# Patient Record
Sex: FEMALE | Race: WHITE | NOT HISPANIC OR LATINO
[De-identification: names, ages, dates, MRNs, and addresses within clinical notes are randomized per-mention and may not be internally consistent; named-entity substitution may affect disease eponyms.]

---

## 2017-06-07 ENCOUNTER — RESULT REVIEW (OUTPATIENT)
Age: 42
End: 2017-06-07

## 2018-06-11 ENCOUNTER — RESULT REVIEW (OUTPATIENT)
Age: 43
End: 2018-06-11

## 2018-06-27 ENCOUNTER — RECORD ABSTRACTING (OUTPATIENT)
Age: 43
End: 2018-06-27

## 2018-07-11 ENCOUNTER — APPOINTMENT (OUTPATIENT)
Dept: PULMONOLOGY | Facility: CLINIC | Age: 43
End: 2018-07-11
Payer: COMMERCIAL

## 2018-07-11 VITALS
HEIGHT: 60 IN | HEART RATE: 85 BPM | DIASTOLIC BLOOD PRESSURE: 66 MMHG | OXYGEN SATURATION: 97 % | SYSTOLIC BLOOD PRESSURE: 108 MMHG | BODY MASS INDEX: 18.65 KG/M2 | TEMPERATURE: 98.7 F | WEIGHT: 95 LBS | RESPIRATION RATE: 16 BRPM

## 2018-07-11 LAB
BILIRUB UR QL STRIP: ABNORMAL
CLARITY UR: CLEAR
COLLECTION METHOD: NORMAL
GLUCOSE UR-MCNC: NEGATIVE
HCG UR QL: 0.2 EU/DL
HGB UR QL STRIP.AUTO: ABNORMAL
KETONES UR-MCNC: NORMAL
LEUKOCYTE ESTERASE UR QL STRIP: NORMAL
NITRITE UR QL STRIP: NEGATIVE
PH UR STRIP: 6
PROT UR STRIP-MCNC: NEGATIVE
SP GR UR STRIP: 1.02

## 2018-07-11 PROCEDURE — 81003 URINALYSIS AUTO W/O SCOPE: CPT | Mod: QW

## 2018-07-11 PROCEDURE — 99213 OFFICE O/P EST LOW 20 MIN: CPT | Mod: 25

## 2018-07-16 LAB
CHOLEST SERPL-MCNC: 228 MG/DL
CHOLEST/HDLC SERPL: 2.7 RATIO
HDLC SERPL-MCNC: 83 MG/DL
LDLC SERPL CALC-MCNC: 124 MG/DL
TRIGL SERPL-MCNC: 106 MG/DL

## 2018-11-19 ENCOUNTER — RX RENEWAL (OUTPATIENT)
Age: 43
End: 2018-11-19

## 2019-01-04 ENCOUNTER — APPOINTMENT (OUTPATIENT)
Dept: PULMONOLOGY | Facility: CLINIC | Age: 44
End: 2019-01-04
Payer: COMMERCIAL

## 2019-01-04 VITALS
HEIGHT: 60 IN | HEART RATE: 81 BPM | DIASTOLIC BLOOD PRESSURE: 71 MMHG | TEMPERATURE: 98.8 F | WEIGHT: 95 LBS | BODY MASS INDEX: 18.65 KG/M2 | SYSTOLIC BLOOD PRESSURE: 108 MMHG | RESPIRATION RATE: 16 BRPM | OXYGEN SATURATION: 98 %

## 2019-01-04 PROCEDURE — 99213 OFFICE O/P EST LOW 20 MIN: CPT | Mod: 25

## 2019-01-04 PROCEDURE — 94060 EVALUATION OF WHEEZING: CPT

## 2019-01-29 ENCOUNTER — MEDICATION RENEWAL (OUTPATIENT)
Age: 44
End: 2019-01-29

## 2019-01-29 ENCOUNTER — APPOINTMENT (OUTPATIENT)
Dept: PULMONOLOGY | Facility: CLINIC | Age: 44
End: 2019-01-29
Payer: COMMERCIAL

## 2019-01-29 VITALS
RESPIRATION RATE: 16 BRPM | HEART RATE: 108 BPM | TEMPERATURE: 99.1 F | OXYGEN SATURATION: 100 % | SYSTOLIC BLOOD PRESSURE: 121 MMHG | DIASTOLIC BLOOD PRESSURE: 82 MMHG

## 2019-01-29 PROCEDURE — 36415 COLL VENOUS BLD VENIPUNCTURE: CPT

## 2019-01-29 PROCEDURE — 87804 INFLUENZA ASSAY W/OPTIC: CPT | Mod: QW

## 2019-01-29 PROCEDURE — 99214 OFFICE O/P EST MOD 30 MIN: CPT | Mod: 25

## 2019-01-29 PROCEDURE — 71046 X-RAY EXAM CHEST 2 VIEWS: CPT

## 2019-01-29 RX ORDER — HYDROCODONE BITARTRATE AND HOMATROPINE METHYLBROMIDE 5; 1.5 MG/5ML; MG/5ML
5-1.5 SOLUTION ORAL
Qty: 150 | Refills: 0 | Status: DISCONTINUED | COMMUNITY
Start: 2019-01-29 | End: 2019-01-29

## 2019-01-29 NOTE — HISTORY OF PRESENT ILLNESS
[FreeTextEntry1] : Few days of feeling poorly. Low grade fever. Today increased cough. Dry cough. \par Rhinitis with yellow secretions.\par \par Kids had flu last week

## 2019-01-29 NOTE — PROCEDURE
[FreeTextEntry1] : PA and lateral chest radiograph reveals a normal heart and mediastinum. Bony structures are intact. Lung fields are clear bilaterally. There is no significant pleural disease.\par

## 2019-01-29 NOTE — PHYSICAL EXAM
[General Appearance - Well Developed] : well developed [General Appearance - Well Nourished] : well nourished [Normal Conjunctiva] : the conjunctiva exhibited no abnormalities [Eyelids - No Xanthelasma] : the eyelids demonstrated no xanthelasmas [Normal Oropharynx] : normal oropharynx [Neck Appearance] : the appearance of the neck was normal [Neck Cervical Mass (___cm)] : no neck mass was observed [Jugular Venous Distention Increased] : there was no jugular-venous distention [Thyroid Diffuse Enlargement] : the thyroid was not enlarged [Thyroid Nodule] : there were no palpable thyroid nodules [Heart Rate And Rhythm] : heart rate and rhythm were normal [Heart Sounds] : normal S1 and S2 [Murmurs] : no murmurs present [Edema] : no peripheral edema present [Auscultation Breath Sounds / Voice Sounds] : lungs were clear to auscultation bilaterally [Lungs Percussion] : the lungs were normal to percussion [Abdomen Soft] : soft [Abdomen Tenderness] : non-tender [Abdomen Mass (___ Cm)] : no abdominal mass palpated [Nail Clubbing] : no clubbing of the fingernails [Cyanosis, Localized] : no localized cyanosis [Petechial Hemorrhages (___cm)] : no petechial hemorrhages [] : no ischemic changes

## 2019-01-29 NOTE — ASSESSMENT
[FreeTextEntry1] : Influenza\par Mild asthma without wheeze. Positive cough\par \par Change arnuity to advair until improved.\par PRN Hydromet\par Tessolon during day\par \par Call or f/u if increasing asthma symptoms

## 2019-01-30 LAB
BASOPHILS # BLD AUTO: 0.01 K/UL
BASOPHILS NFR BLD AUTO: 0.1 %
EOSINOPHIL # BLD AUTO: 0.02 K/UL
EOSINOPHIL NFR BLD AUTO: 0.3 %
FLUAV SPEC QL CULT: NORMAL
FLUBV AG SPEC QL IA: NORMAL
HCT VFR BLD CALC: 42.2 %
HGB BLD-MCNC: 13.5 G/DL
IMM GRANULOCYTES NFR BLD AUTO: 0.3 %
LYMPHOCYTES # BLD AUTO: 1.3 K/UL
LYMPHOCYTES NFR BLD AUTO: 16.9 %
MAN DIFF?: NORMAL
MCHC RBC-ENTMCNC: 29.7 PG
MCHC RBC-ENTMCNC: 32 GM/DL
MCV RBC AUTO: 92.7 FL
MONOCYTES # BLD AUTO: 0.37 K/UL
MONOCYTES NFR BLD AUTO: 4.8 %
NEUTROPHILS # BLD AUTO: 5.95 K/UL
NEUTROPHILS NFR BLD AUTO: 77.6 %
PLATELET # BLD AUTO: 225 K/UL
RBC # BLD: 4.55 M/UL
RBC # FLD: 12.9 %
WBC # FLD AUTO: 7.67 K/UL

## 2019-06-04 ENCOUNTER — NON-APPOINTMENT (OUTPATIENT)
Age: 44
End: 2019-06-04

## 2019-06-04 ENCOUNTER — LABORATORY RESULT (OUTPATIENT)
Age: 44
End: 2019-06-04

## 2019-06-04 ENCOUNTER — APPOINTMENT (OUTPATIENT)
Dept: PULMONOLOGY | Facility: CLINIC | Age: 44
End: 2019-06-04
Payer: COMMERCIAL

## 2019-06-04 VITALS
BODY MASS INDEX: 18.65 KG/M2 | WEIGHT: 95 LBS | SYSTOLIC BLOOD PRESSURE: 121 MMHG | HEART RATE: 75 BPM | DIASTOLIC BLOOD PRESSURE: 78 MMHG | OXYGEN SATURATION: 100 % | HEIGHT: 60 IN

## 2019-06-04 DIAGNOSIS — J10.1 INFLUENZA DUE TO OTHER IDENTIFIED INFLUENZA VIRUS WITH OTHER RESPIRATORY MANIFESTATIONS: ICD-10-CM

## 2019-06-04 LAB
ALBUMIN: 10
BILIRUB UR QL STRIP: NEGATIVE
CLARITY UR: CLEAR
COLLECTION METHOD: NORMAL
CREATININE: 200
GLUCOSE UR-MCNC: NEGATIVE
HCG UR QL: 0.2 EU/DL
HGB UR QL STRIP.AUTO: NEGATIVE
KETONES UR-MCNC: NEGATIVE
LEUKOCYTE ESTERASE UR QL STRIP: NEGATIVE
MICROALBUMIN/CREAT UR TEST STR-RTO: <30
NITRITE UR QL STRIP: NEGATIVE
PH UR STRIP: 7.5
PROT UR STRIP-MCNC: NEGATIVE
SP GR UR STRIP: 1.01

## 2019-06-04 PROCEDURE — 93000 ELECTROCARDIOGRAM COMPLETE: CPT

## 2019-06-04 PROCEDURE — 94060 EVALUATION OF WHEEZING: CPT

## 2019-06-04 PROCEDURE — 82044 UR ALBUMIN SEMIQUANTITATIVE: CPT | Mod: QW

## 2019-06-04 PROCEDURE — 81003 URINALYSIS AUTO W/O SCOPE: CPT | Mod: QW

## 2019-06-04 PROCEDURE — 99396 PREV VISIT EST AGE 40-64: CPT | Mod: 25

## 2019-06-04 PROCEDURE — 36415 COLL VENOUS BLD VENIPUNCTURE: CPT

## 2019-06-04 RX ORDER — BENZONATATE 200 MG/1
200 CAPSULE ORAL 3 TIMES DAILY
Qty: 60 | Refills: 2 | Status: DISCONTINUED | COMMUNITY
Start: 2019-01-29 | End: 2019-06-04

## 2019-06-04 RX ORDER — FLUTICASONE FUROATE 200 UG/1
200 POWDER RESPIRATORY (INHALATION) DAILY
Refills: 0 | Status: DISCONTINUED | COMMUNITY
End: 2019-06-04

## 2019-06-04 RX ORDER — FLUTICASONE PROPIONATE AND SALMETEROL 50; 500 UG/1; UG/1
500-50 POWDER RESPIRATORY (INHALATION)
Qty: 1 | Refills: 5 | Status: DISCONTINUED | COMMUNITY
Start: 2019-01-29 | End: 2019-06-04

## 2019-06-04 RX ORDER — HYDROCODONE POLISTIREX AND CHLORPHENIRAMINE POLISTIREX 10; 8 MG/5ML; MG/5ML
10-8 SUSPENSION, EXTENDED RELEASE ORAL
Qty: 100 | Refills: 0 | Status: DISCONTINUED | COMMUNITY
Start: 2019-01-29 | End: 2019-06-04

## 2019-06-04 RX ORDER — FLUTICASONE FUROATE 200 UG/1
200 POWDER RESPIRATORY (INHALATION) DAILY
Qty: 1 | Refills: 3 | Status: DISCONTINUED | COMMUNITY
Start: 2018-07-11 | End: 2019-06-04

## 2019-06-04 RX ORDER — MONTELUKAST SODIUM 10 MG/1
10 TABLET, FILM COATED ORAL DAILY
Refills: 0 | Status: DISCONTINUED | COMMUNITY
End: 2019-06-04

## 2019-06-04 NOTE — HISTORY OF PRESENT ILLNESS
[FreeTextEntry1] : Colonoscopy \par Mammo Y\par GYN Had appt\par Optho N\par Derm Due\par BMD\par \par \par Doing OK with MD\par Resp status good. Occ cough No wheeze. Diff. with allergies

## 2019-06-04 NOTE — ASSESSMENT
[FreeTextEntry1] : Patient with multiple medical problems. All medical problems as well his medications were reviewed. Medications were renewed.\par \par There is no significant need for change in present medication or therapy.\par \par Asthma meets goals. Denies significant nocturnal symptoms. Rare beta agonist use. Denies significant cough, wheezing, SOB.\par \par Labs drawn in office today\par

## 2019-06-04 NOTE — PHYSICAL EXAM
[General Appearance - Well Developed] : well developed [General Appearance - Well Nourished] : well nourished [Normal Conjunctiva] : the conjunctiva exhibited no abnormalities [Normal Oropharynx] : normal oropharynx [Eyelids - No Xanthelasma] : the eyelids demonstrated no xanthelasmas [Neck Cervical Mass (___cm)] : no neck mass was observed [Jugular Venous Distention Increased] : there was no jugular-venous distention [Neck Appearance] : the appearance of the neck was normal [Thyroid Nodule] : there were no palpable thyroid nodules [Thyroid Diffuse Enlargement] : the thyroid was not enlarged [Heart Rate And Rhythm] : heart rate and rhythm were normal [Heart Sounds] : normal S1 and S2 [Murmurs] : no murmurs present [Edema] : no peripheral edema present [Lungs Percussion] : the lungs were normal to percussion [Auscultation Breath Sounds / Voice Sounds] : lungs were clear to auscultation bilaterally [Abdomen Tenderness] : non-tender [Abdomen Soft] : soft [Abdomen Mass (___ Cm)] : no abdominal mass palpated [Nail Clubbing] : no clubbing of the fingernails [Cyanosis, Localized] : no localized cyanosis [Petechial Hemorrhages (___cm)] : no petechial hemorrhages [Skin Color & Pigmentation] : normal skin color and pigmentation [] : no rash [No Venous Stasis] : no venous stasis [Skin Lesions] : no skin lesions [No Skin Ulcers] : no skin ulcer [Deep Tendon Reflexes (DTR)] : deep tendon reflexes were 2+ and symmetric [No Xanthoma] : no  xanthoma was observed [No Focal Deficits] : no focal deficits [Sensation] : the sensory exam was normal to light touch and pinprick [Oriented To Time, Place, And Person] : oriented to person, place, and time [Impaired Insight] : insight and judgment were intact [Affect] : the affect was normal

## 2019-06-04 NOTE — PROCEDURE
[FreeTextEntry1] : Pulmonary function testing.\par FEV1, FVC, and FEV1/FVC are within normal limits. There was not a significant response to inhaled bronchodilator. \par \par EKG NSR

## 2019-06-11 LAB
ALBUMIN SERPL ELPH-MCNC: 4.4 G/DL
ALP BLD-CCNC: 52 U/L
ALT SERPL-CCNC: 12 U/L
ANION GAP SERPL CALC-SCNC: 13 MMOL/L
AST SERPL-CCNC: 18 U/L
BILIRUB DIRECT SERPL-MCNC: 0.1 MG/DL
BILIRUB INDIRECT SERPL-MCNC: 0.3 MG/DL
BILIRUB SERPL-MCNC: 0.4 MG/DL
BUN SERPL-MCNC: 12 MG/DL
CALCIUM SERPL-MCNC: 9.4 MG/DL
CHLORIDE SERPL-SCNC: 103 MMOL/L
CHOLEST SERPL-MCNC: 241 MG/DL
CHOLEST/HDLC SERPL: 2.7 RATIO
CO2 SERPL-SCNC: 22 MMOL/L
CREAT SERPL-MCNC: 0.63 MG/DL
ESTIMATED AVERAGE GLUCOSE: 105 MG/DL
GLUCOSE SERPL-MCNC: 87 MG/DL
HBA1C MFR BLD HPLC: 5.3 %
HDLC SERPL-MCNC: 90 MG/DL
LDLC SERPL CALC-MCNC: 134 MG/DL
POTASSIUM SERPL-SCNC: 4.2 MMOL/L
PROT SERPL-MCNC: 6.8 G/DL
SODIUM SERPL-SCNC: 138 MMOL/L
T3 SERPL-MCNC: 123 NG/DL
T3RU NFR SERPL: 1.1 TBI
T4 FREE SERPL-MCNC: 1.2 NG/DL
T4 SERPL-MCNC: 7.6 UG/DL
TRIGL SERPL-MCNC: 83 MG/DL
TSH SERPL-ACNC: 3.07 UIU/ML

## 2019-06-28 ENCOUNTER — RX RENEWAL (OUTPATIENT)
Age: 44
End: 2019-06-28

## 2019-09-18 ENCOUNTER — RESULT REVIEW (OUTPATIENT)
Age: 44
End: 2019-09-18

## 2019-12-15 ENCOUNTER — RX RENEWAL (OUTPATIENT)
Age: 44
End: 2019-12-15

## 2020-01-03 ENCOUNTER — RX RENEWAL (OUTPATIENT)
Age: 45
End: 2020-01-03

## 2020-11-20 ENCOUNTER — LABORATORY RESULT (OUTPATIENT)
Age: 45
End: 2020-11-20

## 2020-11-20 ENCOUNTER — NON-APPOINTMENT (OUTPATIENT)
Age: 45
End: 2020-11-20

## 2020-11-20 ENCOUNTER — APPOINTMENT (OUTPATIENT)
Dept: PULMONOLOGY | Facility: CLINIC | Age: 45
End: 2020-11-20
Payer: COMMERCIAL

## 2020-11-20 VITALS
WEIGHT: 95 LBS | OXYGEN SATURATION: 100 % | HEIGHT: 60 IN | TEMPERATURE: 98.2 F | HEART RATE: 80 BPM | BODY MASS INDEX: 18.65 KG/M2 | RESPIRATION RATE: 15 BRPM | DIASTOLIC BLOOD PRESSURE: 85 MMHG | SYSTOLIC BLOOD PRESSURE: 137 MMHG

## 2020-11-20 LAB
ALBUMIN: 30
BILIRUB UR QL STRIP: NORMAL
CLARITY UR: CLEAR
COLLECTION METHOD: NORMAL
CREATININE: 200
GLUCOSE UR-MCNC: NORMAL
HCG UR QL: 0.2 EU/DL
HGB UR QL STRIP.AUTO: NORMAL
KETONES UR-MCNC: NORMAL
LEUKOCYTE ESTERASE UR QL STRIP: NORMAL
MICROALBUMIN/CREAT UR TEST STR-RTO: 30
NITRITE UR QL STRIP: NORMAL
PH UR STRIP: 5.5
PROT UR STRIP-MCNC: NORMAL
SP GR UR STRIP: 1.02

## 2020-11-20 PROCEDURE — 82044 UR ALBUMIN SEMIQUANTITATIVE: CPT | Mod: QW

## 2020-11-20 PROCEDURE — 81003 URINALYSIS AUTO W/O SCOPE: CPT | Mod: QW

## 2020-11-20 PROCEDURE — 93000 ELECTROCARDIOGRAM COMPLETE: CPT

## 2020-11-20 PROCEDURE — 36415 COLL VENOUS BLD VENIPUNCTURE: CPT

## 2020-11-20 PROCEDURE — 99396 PREV VISIT EST AGE 40-64: CPT | Mod: 25

## 2020-11-23 ENCOUNTER — RESULT REVIEW (OUTPATIENT)
Age: 45
End: 2020-11-23

## 2020-11-23 ENCOUNTER — RX RENEWAL (OUTPATIENT)
Age: 45
End: 2020-11-23

## 2020-11-24 LAB
25(OH)D3 SERPL-MCNC: 47.5 NG/ML
ALBUMIN SERPL ELPH-MCNC: 4.8 G/DL
ALP BLD-CCNC: 51 U/L
ALT SERPL-CCNC: 9 U/L
ANION GAP SERPL CALC-SCNC: 11 MMOL/L
APPEARANCE: CLEAR
AST SERPL-CCNC: 14 U/L
BACTERIA UR CULT: NORMAL
BACTERIA: NEGATIVE
BASOPHILS # BLD AUTO: 0.04 K/UL
BASOPHILS NFR BLD AUTO: 0.6 %
BILIRUB DIRECT SERPL-MCNC: 0.1 MG/DL
BILIRUB INDIRECT SERPL-MCNC: 0.2 MG/DL
BILIRUB SERPL-MCNC: 0.3 MG/DL
BILIRUBIN URINE: NEGATIVE
BLOOD URINE: NEGATIVE
BUN SERPL-MCNC: 17 MG/DL
CALCIUM SERPL-MCNC: 9.4 MG/DL
CHLORIDE SERPL-SCNC: 101 MMOL/L
CHOLEST SERPL-MCNC: 259 MG/DL
CO2 SERPL-SCNC: 24 MMOL/L
COLOR: NORMAL
CREAT SERPL-MCNC: 0.63 MG/DL
EOSINOPHIL # BLD AUTO: 0.06 K/UL
EOSINOPHIL NFR BLD AUTO: 0.9 %
ESTIMATED AVERAGE GLUCOSE: 103 MG/DL
GLUCOSE QUALITATIVE U: NEGATIVE
GLUCOSE SERPL-MCNC: 86 MG/DL
HBA1C MFR BLD HPLC: 5.2 %
HCT VFR BLD CALC: 42.8 %
HDLC SERPL-MCNC: 94 MG/DL
HGB BLD-MCNC: 13.4 G/DL
HYALINE CASTS: 0 /LPF
IMM GRANULOCYTES NFR BLD AUTO: 0.3 %
KETONES URINE: NEGATIVE
LDLC SERPL CALC-MCNC: 152 MG/DL
LEUKOCYTE ESTERASE URINE: NEGATIVE
LYMPHOCYTES # BLD AUTO: 2.04 K/UL
LYMPHOCYTES NFR BLD AUTO: 29.6 %
MAN DIFF?: NORMAL
MCHC RBC-ENTMCNC: 30.2 PG
MCHC RBC-ENTMCNC: 31.3 GM/DL
MCV RBC AUTO: 96.6 FL
MICROSCOPIC-UA: NORMAL
MONOCYTES # BLD AUTO: 0.41 K/UL
MONOCYTES NFR BLD AUTO: 6 %
NEUTROPHILS # BLD AUTO: 4.32 K/UL
NEUTROPHILS NFR BLD AUTO: 62.6 %
NITRITE URINE: NEGATIVE
NONHDLC SERPL-MCNC: 166 MG/DL
PH URINE: 6
PLATELET # BLD AUTO: 257 K/UL
POTASSIUM SERPL-SCNC: 4.5 MMOL/L
PROT SERPL-MCNC: 7 G/DL
PROTEIN URINE: NEGATIVE
RBC # BLD: 4.43 M/UL
RBC # FLD: 12.2 %
RED BLOOD CELLS URINE: 4 /HPF
SODIUM SERPL-SCNC: 137 MMOL/L
SPECIFIC GRAVITY URINE: 1.03
SQUAMOUS EPITHELIAL CELLS: 6 /HPF
T3 SERPL-MCNC: 134 NG/DL
T3RU NFR SERPL: 1.2 TBI
T4 FREE SERPL-MCNC: 1.3 NG/DL
T4 SERPL-MCNC: 8.7 UG/DL
TRIGL SERPL-MCNC: 68 MG/DL
TSH SERPL-ACNC: 2.22 UIU/ML
UROBILINOGEN URINE: NORMAL
WBC # FLD AUTO: 6.89 K/UL
WHITE BLOOD CELLS URINE: 1 /HPF

## 2020-11-24 NOTE — DISCUSSION/SUMMARY
[FreeTextEntry1] : Asthma meets goals. Denies significant nocturnal symptoms. Rare beta agonist use. Denies significant cough, wheezing, SOB.\par Hyperlipidemia.\par Multiple sclerosis.  Mild

## 2020-11-24 NOTE — HISTORY OF PRESENT ILLNESS
[FreeTextEntry1] : Colonoscopy  N\par Mammo Y\par GYN Had appt\par Optho Has appt.\par Derm Due\par BMD On birth control pills but premenopausal.\par \par \par Father  of AML in .\par \par No significant cough, wheezing, chest pain or SOB.\par \par No significant cough, wheezing, chest pain or SOB.\par \par

## 2020-11-24 NOTE — PHYSICAL EXAM
[No Acute Distress] : no acute distress [Supple] : supple [Thyroid Not Enlarged] : thyroid not enlarged [No Neck Mass] : no neck mass [No JVD] : no jvd [Normal S1, S2] : normal s1, s2 [No Murmurs] : no murmurs [Clear to Auscultation Bilaterally] : clear to auscultation bilaterally [Normal to Percussion] : normal to percussion [No Abnormalities] : no abnormalities [Benign] : benign [No Clubbing] : no clubbing [No Cyanosis] : no cyanosis [No Edema] : no edema [No Focal Deficits] : no focal deficits [Oriented x3] : oriented x3 [TextBox_80] : Breast examination normal.

## 2021-01-22 DIAGNOSIS — Z80.9 FAMILY HISTORY OF MALIGNANT NEOPLASM, UNSPECIFIED: ICD-10-CM

## 2021-01-22 DIAGNOSIS — G40.909 EPILEPSY, UNSPECIFIED, NOT INTRACTABLE, W/OUT STATUS EPILEPTICUS: ICD-10-CM

## 2021-01-22 DIAGNOSIS — Z78.9 OTHER SPECIFIED HEALTH STATUS: ICD-10-CM

## 2021-01-22 DIAGNOSIS — Z82.49 FAMILY HISTORY OF ISCHEMIC HEART DISEASE AND OTHER DISEASES OF THE CIRCULATORY SYSTEM: ICD-10-CM

## 2021-01-22 LAB — CYTOLOGY CVX/VAG DOC THIN PREP: NORMAL

## 2021-01-22 RX ORDER — CLOTRIMAZOLE AND BETAMETHASONE DIPROPIONATE 10; .5 MG/G; MG/G
1-0.05 CREAM TOPICAL
Refills: 0 | Status: ACTIVE | COMMUNITY

## 2021-01-25 ENCOUNTER — APPOINTMENT (OUTPATIENT)
Dept: OBGYN | Facility: CLINIC | Age: 46
End: 2021-01-25

## 2021-03-02 ENCOUNTER — APPOINTMENT (OUTPATIENT)
Dept: OPHTHALMOLOGY | Facility: CLINIC | Age: 46
End: 2021-03-02

## 2021-06-15 ENCOUNTER — APPOINTMENT (OUTPATIENT)
Dept: OBGYN | Facility: CLINIC | Age: 46
End: 2021-06-15
Payer: COMMERCIAL

## 2021-06-15 ENCOUNTER — ASOB RESULT (OUTPATIENT)
Age: 46
End: 2021-06-15

## 2021-06-15 PROCEDURE — 99072 ADDL SUPL MATRL&STAF TM PHE: CPT

## 2021-06-15 PROCEDURE — 76830 TRANSVAGINAL US NON-OB: CPT

## 2021-09-14 ENCOUNTER — APPOINTMENT (OUTPATIENT)
Dept: PULMONOLOGY | Facility: CLINIC | Age: 46
End: 2021-09-14
Payer: COMMERCIAL

## 2021-09-14 VITALS
HEART RATE: 92 BPM | TEMPERATURE: 97.8 F | OXYGEN SATURATION: 96 % | DIASTOLIC BLOOD PRESSURE: 74 MMHG | SYSTOLIC BLOOD PRESSURE: 118 MMHG

## 2021-09-14 PROCEDURE — 36415 COLL VENOUS BLD VENIPUNCTURE: CPT

## 2021-09-14 PROCEDURE — 99213 OFFICE O/P EST LOW 20 MIN: CPT | Mod: 25

## 2021-09-16 NOTE — HISTORY OF PRESENT ILLNESS
[FreeTextEntry1] : Feeling well\par No complaints.\par No significant cough, wheezing, chest pain or SOB.\par \par Vaccinated.\par Needs form.\par \par \par \par

## 2021-09-17 LAB
M TB IFN-G BLD-IMP: NEGATIVE
QUANTIFERON TB PLUS MITOGEN MINUS NIL: 9.28 IU/ML
QUANTIFERON TB PLUS NIL: 0.02 IU/ML
QUANTIFERON TB PLUS TB1 MINUS NIL: -0.01 IU/ML
QUANTIFERON TB PLUS TB2 MINUS NIL: 0 IU/ML

## 2021-11-17 ENCOUNTER — RX RENEWAL (OUTPATIENT)
Age: 46
End: 2021-11-17

## 2021-12-04 RX ORDER — NORETHINDRONE ACETATE AND ETHINYL ESTRADIOL AND FERROUS FUMARATE 1.5-30(21)
1.5-3 KIT ORAL DAILY
Qty: 1 | Refills: 0 | Status: ACTIVE | COMMUNITY
Start: 2021-12-03 | End: 1900-01-01

## 2021-12-27 ENCOUNTER — RX RENEWAL (OUTPATIENT)
Age: 46
End: 2021-12-27

## 2021-12-27 ENCOUNTER — APPOINTMENT (OUTPATIENT)
Dept: PULMONOLOGY | Facility: CLINIC | Age: 46
End: 2021-12-27
Payer: COMMERCIAL

## 2021-12-27 ENCOUNTER — LABORATORY RESULT (OUTPATIENT)
Age: 46
End: 2021-12-27

## 2021-12-27 ENCOUNTER — APPOINTMENT (OUTPATIENT)
Dept: OBGYN | Facility: CLINIC | Age: 46
End: 2021-12-27
Payer: COMMERCIAL

## 2021-12-27 ENCOUNTER — NON-APPOINTMENT (OUTPATIENT)
Age: 46
End: 2021-12-27

## 2021-12-27 VITALS
HEART RATE: 89 BPM | OXYGEN SATURATION: 98 % | TEMPERATURE: 98.2 F | WEIGHT: 100.38 LBS | BODY MASS INDEX: 19.71 KG/M2 | HEIGHT: 60 IN | DIASTOLIC BLOOD PRESSURE: 50 MMHG | SYSTOLIC BLOOD PRESSURE: 108 MMHG

## 2021-12-27 VITALS
SYSTOLIC BLOOD PRESSURE: 121 MMHG | BODY MASS INDEX: 19.63 KG/M2 | HEIGHT: 60 IN | WEIGHT: 100 LBS | DIASTOLIC BLOOD PRESSURE: 74 MMHG

## 2021-12-27 DIAGNOSIS — N95.1 MENOPAUSAL AND FEMALE CLIMACTERIC STATES: ICD-10-CM

## 2021-12-27 DIAGNOSIS — Z30.09 ENCOUNTER FOR OTHER GENERAL COUNSELING AND ADVICE ON CONTRACEPTION: ICD-10-CM

## 2021-12-27 LAB
ALBUMIN: 10
BILIRUB UR QL STRIP: NEGATIVE
CLARITY UR: CLEAR
COLLECTION METHOD: NORMAL
CREATININE: 100
GLUCOSE UR-MCNC: NEGATIVE
HCG UR QL: 0.2 EU/DL
HGB UR QL STRIP.AUTO: NORMAL
KETONES UR-MCNC: NEGATIVE
LEUKOCYTE ESTERASE UR QL STRIP: NEGATIVE
MICROALBUMIN/CREAT UR TEST STR-RTO: <30
NITRITE UR QL STRIP: NEGATIVE
PH UR STRIP: 6.5
PROT UR STRIP-MCNC: NEGATIVE
SP GR UR STRIP: 1.02

## 2021-12-27 PROCEDURE — 82044 UR ALBUMIN SEMIQUANTITATIVE: CPT | Mod: QW

## 2021-12-27 PROCEDURE — 82274 ASSAY TEST FOR BLOOD FECAL: CPT | Mod: QW

## 2021-12-27 PROCEDURE — 36415 COLL VENOUS BLD VENIPUNCTURE: CPT

## 2021-12-27 PROCEDURE — 81003 URINALYSIS AUTO W/O SCOPE: CPT | Mod: QW

## 2021-12-27 PROCEDURE — 99396 PREV VISIT EST AGE 40-64: CPT

## 2021-12-27 PROCEDURE — 93000 ELECTROCARDIOGRAM COMPLETE: CPT

## 2021-12-27 PROCEDURE — 99396 PREV VISIT EST AGE 40-64: CPT | Mod: 25

## 2021-12-27 RX ORDER — NYSTATIN 100000 1/G
100000 POWDER TOPICAL
Qty: 1 | Refills: 3 | Status: ACTIVE | COMMUNITY
Start: 2021-12-27 | End: 1900-01-01

## 2021-12-27 RX ORDER — NORETHINDRONE ACETATE AND ETHINYL ESTRADIOL 1.5; 3 MG/1; UG/1
1.5-3 TABLET ORAL
Refills: 0 | Status: DISCONTINUED | COMMUNITY
End: 2021-12-27

## 2021-12-27 NOTE — PHYSICAL EXAM
[No Acute Distress] : no acute distress [Supple] : supple [Thyroid Not Enlarged] : thyroid not enlarged [No Neck Mass] : no neck mass [No JVD] : no jvd [Normal S1, S2] : normal s1, s2 [No Murmurs] : no murmurs [Clear to Auscultation Bilaterally] : clear to auscultation bilaterally [Normal to Percussion] : normal to percussion [No Abnormalities] : no abnormalities [Benign] : benign [No Clubbing] : no clubbing [No Cyanosis] : no cyanosis [No Edema] : no edema [No Focal Deficits] : no focal deficits [Oriented x3] : oriented x3 [TextBox_80] : Breast examination desires at gynecologist.  Going today.

## 2021-12-27 NOTE — PLAN
[FreeTextEntry1] : Routine Gyn Exam:\par BSE taught\par Pap smear conducted.\par Rx given for Loestrin Fe 1.5/30 MG.\par Rx given for mammogram and breast sonogram.\par Colonoscopy scheduled in July 2022 w/ GI MD Dr. Sergio Holly.\par RTO in 1 year or PRN\par

## 2021-12-27 NOTE — PHYSICAL EXAM
[Appropriately responsive] : appropriately responsive [Alert] : alert [No Acute Distress] : no acute distress [No Lymphadenopathy] : no lymphadenopathy [Regular Rate Rhythm] : regular rate rhythm [No Murmurs] : no murmurs [Clear to Auscultation B/L] : clear to auscultation bilaterally [Soft] : soft [Non-tender] : non-tender [Non-distended] : non-distended [No HSM] : No HSM [No Lesions] : no lesions [No Mass] : no mass [Oriented x3] : oriented x3 [Examination Of The Breasts] : a normal appearance [No Masses] : no breast masses were palpable [Labia Majora] : normal [Labia Minora] : normal [Normal] : normal [Uterine Adnexae] : normal [Chaperone Present] : A chaperone was present in the examining room during all aspects of the physical examination [FreeTextEntry9] : Guaiac negative, no masses

## 2021-12-27 NOTE — ASSESSMENT
[FreeTextEntry1] : Seeing gynecology today.\par Medications reviewed and renewed.\par Labs drawn in office today\par

## 2021-12-27 NOTE — HISTORY OF PRESENT ILLNESS
[FreeTextEntry1] : Colonoscopy  N\par Mammo Yearly\par GYN Had appt\par Optho N\par Derm Due\par BMD On birth control pills but premenopausal.\par \par \par Father  of AML in .\par \par No significant cough, wheezing, chest pain or SOB.\par \par \par Got vaccinated no Booster.

## 2021-12-27 NOTE — HISTORY OF PRESENT ILLNESS
[Patient reported mammogram was normal] : Patient reported mammogram was normal [Patient reported breast sonogram was normal] : Patient reported breast sonogram was normal [Patient reported PAP Smear was normal] : Patient reported PAP Smear was normal [FreeTextEntry1] : 2021. WAYLON MACKENZIE 46 year old female  LMP 2021, presents for annual gyn exam.\par \par She feels well and has no complaints. \par \par Pt has been taking OCPs since 18 y/o. She is amenorrheic while on Loestrin Fe 1.5/30 MG. Sexually active w/ . Denies sexual dysfunction.\par \par No abnormal vaginal bleeding, vaginal discharge, or vaginitis symptoms. No urinary complaints. BM is normal per patient. She denies abdominal and pelvic pain.\par \par MedHx seizure disorder, asthma, MS.\par FHx father  of acute myeloleukemia. \par  [TextBox_4] : Pelvic sonogram from June 2021 was nml. [BreastSonogramDate] : 03/21 [Mammogramdate] : 03/21 [PapSmeardate] : 2020

## 2021-12-28 LAB
25(OH)D3 SERPL-MCNC: 38.5 NG/ML
ALBUMIN SERPL ELPH-MCNC: 4.4 G/DL
ALP BLD-CCNC: 41 U/L
ALT SERPL-CCNC: 7 U/L
ANION GAP SERPL CALC-SCNC: 11 MMOL/L
APPEARANCE: CLEAR
AST SERPL-CCNC: 12 U/L
BACTERIA: NEGATIVE
BASOPHILS # BLD AUTO: 0.02 K/UL
BASOPHILS NFR BLD AUTO: 0.3 %
BILIRUB DIRECT SERPL-MCNC: 0.1 MG/DL
BILIRUB INDIRECT SERPL-MCNC: 0.2 MG/DL
BILIRUB SERPL-MCNC: 0.3 MG/DL
BILIRUBIN URINE: NEGATIVE
BLOOD URINE: NEGATIVE
BUN SERPL-MCNC: 11 MG/DL
CALCIUM SERPL-MCNC: 9.2 MG/DL
CHLORIDE SERPL-SCNC: 102 MMOL/L
CHOLEST SERPL-MCNC: 219 MG/DL
CO2 SERPL-SCNC: 25 MMOL/L
COLOR: NORMAL
CREAT SERPL-MCNC: 0.64 MG/DL
CREAT SPEC-SCNC: 64 MG/DL
EOSINOPHIL # BLD AUTO: 0.11 K/UL
EOSINOPHIL NFR BLD AUTO: 1.6 %
ESTIMATED AVERAGE GLUCOSE: 108 MG/DL
GLUCOSE QUALITATIVE U: NEGATIVE
GLUCOSE SERPL-MCNC: 84 MG/DL
HBA1C MFR BLD HPLC: 5.4 %
HCT VFR BLD CALC: 38 %
HDLC SERPL-MCNC: 85 MG/DL
HGB BLD-MCNC: 12.3 G/DL
HPV HIGH+LOW RISK DNA PNL CVX: NOT DETECTED
HYALINE CASTS: 1 /LPF
IMM GRANULOCYTES NFR BLD AUTO: 0.3 %
KETONES URINE: NEGATIVE
LDLC SERPL CALC-MCNC: 115 MG/DL
LEUKOCYTE ESTERASE URINE: NEGATIVE
LYMPHOCYTES # BLD AUTO: 1.96 K/UL
LYMPHOCYTES NFR BLD AUTO: 28.3 %
MAN DIFF?: NORMAL
MCHC RBC-ENTMCNC: 30.2 PG
MCHC RBC-ENTMCNC: 32.4 GM/DL
MCV RBC AUTO: 93.4 FL
MICROALBUMIN 24H UR DL<=1MG/L-MCNC: <1.2 MG/DL
MICROALBUMIN/CREAT 24H UR-RTO: NORMAL MG/G
MICROSCOPIC-UA: NORMAL
MONOCYTES # BLD AUTO: 0.4 K/UL
MONOCYTES NFR BLD AUTO: 5.8 %
NEUTROPHILS # BLD AUTO: 4.42 K/UL
NEUTROPHILS NFR BLD AUTO: 63.7 %
NITRITE URINE: NEGATIVE
NONHDLC SERPL-MCNC: 134 MG/DL
PH URINE: 6.5
PLATELET # BLD AUTO: 229 K/UL
POTASSIUM SERPL-SCNC: 4.2 MMOL/L
PROT SERPL-MCNC: 6.5 G/DL
PROTEIN URINE: NEGATIVE
RBC # BLD: 4.07 M/UL
RBC # FLD: 12.4 %
RED BLOOD CELLS URINE: 2 /HPF
SODIUM SERPL-SCNC: 138 MMOL/L
SPECIFIC GRAVITY URINE: 1.01
SQUAMOUS EPITHELIAL CELLS: 3 /HPF
T3 SERPL-MCNC: 120 NG/DL
T3RU NFR SERPL: 1.2 TBI
T4 FREE SERPL-MCNC: 1.1 NG/DL
T4 SERPL-MCNC: 7.5 UG/DL
TRIGL SERPL-MCNC: 92 MG/DL
TSH SERPL-ACNC: 2.17 UIU/ML
UROBILINOGEN URINE: NORMAL
WBC # FLD AUTO: 6.93 K/UL
WHITE BLOOD CELLS URINE: 0 /HPF

## 2021-12-29 LAB — BACTERIA UR CULT: NORMAL

## 2022-01-17 LAB — CYTOLOGY CVX/VAG DOC THIN PREP: NORMAL

## 2022-03-30 ENCOUNTER — RESULT REVIEW (OUTPATIENT)
Age: 47
End: 2022-03-30

## 2022-03-30 ENCOUNTER — APPOINTMENT (OUTPATIENT)
Dept: MAMMOGRAPHY | Facility: CLINIC | Age: 47
End: 2022-03-30
Payer: COMMERCIAL

## 2022-03-30 ENCOUNTER — APPOINTMENT (OUTPATIENT)
Dept: ULTRASOUND IMAGING | Facility: CLINIC | Age: 47
End: 2022-03-30
Payer: COMMERCIAL

## 2022-03-30 PROCEDURE — 76641 ULTRASOUND BREAST COMPLETE: CPT | Mod: 50

## 2022-03-30 PROCEDURE — 77067 SCR MAMMO BI INCL CAD: CPT

## 2022-03-30 PROCEDURE — 77063 BREAST TOMOSYNTHESIS BI: CPT

## 2022-04-18 ENCOUNTER — RX RENEWAL (OUTPATIENT)
Age: 47
End: 2022-04-18

## 2022-05-13 ENCOUNTER — APPOINTMENT (OUTPATIENT)
Dept: PULMONOLOGY | Facility: CLINIC | Age: 47
End: 2022-05-13
Payer: COMMERCIAL

## 2022-05-13 DIAGNOSIS — U07.1 COVID-19: ICD-10-CM

## 2022-05-13 PROCEDURE — 99214 OFFICE O/P EST MOD 30 MIN: CPT | Mod: 95

## 2022-05-14 NOTE — HISTORY OF PRESENT ILLNESS
[TextBox_4] : Tested positive for COVID rapid antigen test today, started having symptoms with nasal congestion and scratchy throat 2 days ago, had low-grade temperature of 99 °F earlier today, has no other symptoms.\par \par \par This visit was provided via telehealth using real-time 2-way audio visual technology.  The patient, WAYLON MACKENZIE, was located at home, [address] [place] [address]\[place] [address] at the time of the visit.  \par The provider, Ruchi Holly, was located at home in [place], [place] at the time of the visit. \par The patient, Ms. WAYLON MACKENZIE  and physician Ruchi Holly DO, participated in the telehealth encounter.\par \par Verbal consent was obtained by the  from patient.\par

## 2022-05-14 NOTE — ASSESSMENT
[FreeTextEntry1] : Advised patient on well hydration and rest.\par Also advised patient on ambulation for PE/DVT prophylaxis.\par Advised patient to take Medrol Dosepak as anti-inflammatory, patient refused.\par Medications reviewed. Continue present medications.\par \par \par Time spent in telehealth consultation and charting is 30 minutes.\par

## 2022-05-14 NOTE — DISCUSSION/SUMMARY
[FreeTextEntry1] : Yelena is a patient with acute COVID-19 infection.  History of asthma.  History of multiple sclerosis.

## 2022-05-18 ENCOUNTER — RX RENEWAL (OUTPATIENT)
Age: 47
End: 2022-05-18

## 2022-06-26 ENCOUNTER — RX RENEWAL (OUTPATIENT)
Age: 47
End: 2022-06-26

## 2022-08-10 ENCOUNTER — RX RENEWAL (OUTPATIENT)
Age: 47
End: 2022-08-10

## 2022-09-15 ENCOUNTER — RX RENEWAL (OUTPATIENT)
Age: 47
End: 2022-09-15

## 2022-10-19 ENCOUNTER — RX RENEWAL (OUTPATIENT)
Age: 47
End: 2022-10-19

## 2022-11-01 ENCOUNTER — RX RENEWAL (OUTPATIENT)
Age: 47
End: 2022-11-01

## 2022-11-08 ENCOUNTER — APPOINTMENT (OUTPATIENT)
Dept: OPHTHALMOLOGY | Facility: CLINIC | Age: 47
End: 2022-11-08

## 2022-11-16 ENCOUNTER — RX RENEWAL (OUTPATIENT)
Age: 47
End: 2022-11-16

## 2022-11-16 RX ORDER — MONTELUKAST 10 MG/1
10 TABLET, FILM COATED ORAL
Qty: 30 | Refills: 5 | Status: ACTIVE | COMMUNITY
Start: 2018-07-11 | End: 1900-01-01

## 2023-01-05 ENCOUNTER — RESULT CHARGE (OUTPATIENT)
Age: 48
End: 2023-01-05

## 2023-01-06 ENCOUNTER — NON-APPOINTMENT (OUTPATIENT)
Age: 48
End: 2023-01-06

## 2023-01-06 ENCOUNTER — APPOINTMENT (OUTPATIENT)
Dept: PULMONOLOGY | Facility: CLINIC | Age: 48
End: 2023-01-06
Payer: COMMERCIAL

## 2023-01-06 ENCOUNTER — LABORATORY RESULT (OUTPATIENT)
Age: 48
End: 2023-01-06

## 2023-01-06 VITALS
WEIGHT: 95 LBS | DIASTOLIC BLOOD PRESSURE: 72 MMHG | HEIGHT: 60 IN | BODY MASS INDEX: 18.65 KG/M2 | SYSTOLIC BLOOD PRESSURE: 107 MMHG | OXYGEN SATURATION: 100 % | HEART RATE: 86 BPM

## 2023-01-06 DIAGNOSIS — Z00.00 ENCOUNTER FOR GENERAL ADULT MEDICAL EXAMINATION W/OUT ABNORMAL FINDINGS: ICD-10-CM

## 2023-01-06 DIAGNOSIS — R79.89 OTHER SPECIFIED ABNORMAL FINDINGS OF BLOOD CHEMISTRY: ICD-10-CM

## 2023-01-06 DIAGNOSIS — E78.5 HYPERLIPIDEMIA, UNSPECIFIED: ICD-10-CM

## 2023-01-06 LAB
ALBUMIN: 10
BILIRUB UR QL STRIP: NORMAL
CLARITY UR: CLEAR
COLLECTION METHOD: NORMAL
CREATININE: 50
GLUCOSE UR-MCNC: NORMAL
HCG UR QL: 1 EU/DL
HGB UR QL STRIP.AUTO: NORMAL
KETONES UR-MCNC: NORMAL
LEUKOCYTE ESTERASE UR QL STRIP: NORMAL
MICROALBUMIN/CREAT UR TEST STR-RTO: 30
NITRITE UR QL STRIP: NORMAL
PH UR STRIP: 7
PROT UR STRIP-MCNC: NORMAL
SP GR UR STRIP: 1.02

## 2023-01-06 PROCEDURE — 81003 URINALYSIS AUTO W/O SCOPE: CPT | Mod: QW

## 2023-01-06 PROCEDURE — 95012 NITRIC OXIDE EXP GAS DETER: CPT

## 2023-01-06 PROCEDURE — 94060 EVALUATION OF WHEEZING: CPT

## 2023-01-06 PROCEDURE — 93000 ELECTROCARDIOGRAM COMPLETE: CPT

## 2023-01-06 PROCEDURE — 36415 COLL VENOUS BLD VENIPUNCTURE: CPT

## 2023-01-06 PROCEDURE — 99396 PREV VISIT EST AGE 40-64: CPT | Mod: 25

## 2023-01-06 PROCEDURE — 82044 UR ALBUMIN SEMIQUANTITATIVE: CPT | Mod: QW

## 2023-01-06 NOTE — HISTORY OF PRESENT ILLNESS
[TextBox_4] : Colonoscopy due to see GI in July\par Mammo Y\par GYN Y\par Optho just for glasses \par Derm Y\par BMD on birth control  does not get menses. On since age 19.\par \par on Arnuity daily\par no breathing issues\par seeing neuro for MS on Copaxone generic and stable\par \par got flu shot\par does not want COVID booster\par Had COVID May 2022

## 2023-01-06 NOTE — ASSESSMENT
[FreeTextEntry1] : Trial off Singulair\par Consider D/C arnuity in future. \par Medications reviewed and renewed.\par Labs drawn in office today\par

## 2023-01-06 NOTE — PROCEDURE
[FreeTextEntry1] : 01/06/2023\par Pulmonary function testing\par FEV1, FVC, and FEV1/FVC are within normal limits. \par \par EKG NSR no change from prior.

## 2023-01-06 NOTE — PHYSICAL EXAM
[No Acute Distress] : no acute distress [Supple] : supple [Thyroid Not Enlarged] : thyroid not enlarged [No Neck Mass] : no neck mass [No JVD] : no jvd [Normal S1, S2] : normal s1, s2 [No Murmurs] : no murmurs [Clear to Auscultation Bilaterally] : clear to auscultation bilaterally [Normal to Percussion] : normal to percussion [No Abnormalities] : no abnormalities [Benign] : benign [No Clubbing] : no clubbing [No Cyanosis] : no cyanosis [No Edema] : no edema [No Focal Deficits] : no focal deficits [Oriented x3] : oriented x3 [Not Tender] : not tender [No HSM] : no hsm [TextBox_80] : Breast examination negative.

## 2023-01-09 LAB
25(OH)D3 SERPL-MCNC: 52.3 NG/ML
ALBUMIN SERPL ELPH-MCNC: 4.6 G/DL
ALP BLD-CCNC: 56 U/L
ALT SERPL-CCNC: 8 U/L
ANION GAP SERPL CALC-SCNC: 13 MMOL/L
AST SERPL-CCNC: 13 U/L
BASOPHILS # BLD AUTO: 0.05 K/UL
BASOPHILS NFR BLD AUTO: 0.7 %
BILIRUB DIRECT SERPL-MCNC: 0.1 MG/DL
BILIRUB INDIRECT SERPL-MCNC: 0.2 MG/DL
BILIRUB SERPL-MCNC: 0.3 MG/DL
BUN SERPL-MCNC: 17 MG/DL
CALCIUM SERPL-MCNC: 9.5 MG/DL
CHLORIDE SERPL-SCNC: 102 MMOL/L
CO2 SERPL-SCNC: 23 MMOL/L
CREAT SERPL-MCNC: 0.62 MG/DL
EGFR: 111 ML/MIN/1.73M2
EOSINOPHIL # BLD AUTO: 0.24 K/UL
EOSINOPHIL NFR BLD AUTO: 3.5 %
ESTIMATED AVERAGE GLUCOSE: 100 MG/DL
GLUCOSE SERPL-MCNC: 84 MG/DL
HBA1C MFR BLD HPLC: 5.1 %
HCT VFR BLD CALC: 41.2 %
HGB BLD-MCNC: 13.4 G/DL
IMM GRANULOCYTES NFR BLD AUTO: 0.3 %
LYMPHOCYTES # BLD AUTO: 2.16 K/UL
LYMPHOCYTES NFR BLD AUTO: 31.4 %
MAN DIFF?: NORMAL
MCHC RBC-ENTMCNC: 30.4 PG
MCHC RBC-ENTMCNC: 32.5 GM/DL
MCV RBC AUTO: 93.4 FL
MONOCYTES # BLD AUTO: 0.46 K/UL
MONOCYTES NFR BLD AUTO: 6.7 %
NEUTROPHILS # BLD AUTO: 3.95 K/UL
NEUTROPHILS NFR BLD AUTO: 57.4 %
PLATELET # BLD AUTO: 282 K/UL
POTASSIUM SERPL-SCNC: 4.5 MMOL/L
PROT SERPL-MCNC: 7.1 G/DL
RBC # BLD: 4.41 M/UL
RBC # FLD: 12.1 %
SODIUM SERPL-SCNC: 138 MMOL/L
T3 SERPL-MCNC: 139 NG/DL
T3RU NFR SERPL: 1.1 TBI
T4 FREE SERPL-MCNC: 1.2 NG/DL
T4 SERPL-MCNC: 10 UG/DL
TSH SERPL-ACNC: 1.87 UIU/ML
WBC # FLD AUTO: 6.88 K/UL

## 2023-01-17 ENCOUNTER — APPOINTMENT (OUTPATIENT)
Dept: CT IMAGING | Facility: CLINIC | Age: 48
End: 2023-01-17
Payer: SELF-PAY

## 2023-01-17 PROCEDURE — 75571 CT HRT W/O DYE W/CA TEST: CPT

## 2023-01-20 ENCOUNTER — NON-APPOINTMENT (OUTPATIENT)
Age: 48
End: 2023-01-20

## 2023-01-27 ENCOUNTER — RX RENEWAL (OUTPATIENT)
Age: 48
End: 2023-01-27

## 2023-02-24 ENCOUNTER — RX RENEWAL (OUTPATIENT)
Age: 48
End: 2023-02-24

## 2023-03-07 ENCOUNTER — RX RENEWAL (OUTPATIENT)
Age: 48
End: 2023-03-07

## 2023-04-20 ENCOUNTER — RX RENEWAL (OUTPATIENT)
Age: 48
End: 2023-04-20

## 2023-05-04 ENCOUNTER — APPOINTMENT (OUTPATIENT)
Dept: OBGYN | Facility: CLINIC | Age: 48
End: 2023-05-04
Payer: COMMERCIAL

## 2023-05-04 VITALS
WEIGHT: 95 LBS | BODY MASS INDEX: 18.65 KG/M2 | HEIGHT: 60 IN | SYSTOLIC BLOOD PRESSURE: 118 MMHG | DIASTOLIC BLOOD PRESSURE: 79 MMHG

## 2023-05-04 PROCEDURE — 99396 PREV VISIT EST AGE 40-64: CPT

## 2023-05-04 PROCEDURE — 82270 OCCULT BLOOD FECES: CPT

## 2023-05-04 NOTE — PHYSICAL EXAM

## 2023-05-04 NOTE — PLAN
[FreeTextEntry1] : 48 year old female pt presents for routine gyn exam\par Breast and pelvic exam performed\par Pap/HPV conducted\par Rx given for mammogram and breast sonogram\par Advised pt to schedule colonoscopy when due, pt will schedule w/ her GI\par Pt to schedule a pelvic sono\par \par Contraception: \par Rx renewal given for Loestrin\par \par RTO in 1 year or PRN

## 2023-05-04 NOTE — HISTORY OF PRESENT ILLNESS
[Currently Active] : currently active [No] : No [Yes] : Yes [FreeTextEntry1] : 2023. WAYLON MACKENZIE 48 year old female  LMP 23. She presents for an annual gyn exam and offers no complaints. \par \par No menses on Lo estrin. Denies breakthrough bleeding, vaginal discharge and vaginitis sxs. Denies abdominal or pelvic pain. She has normal BMs. Denies bloody stool and urinary complaints. She is currently sexually active w/ long term stable partner. Denies FHx of breast, ovarian, uterine or colon cancer. No new medical conditions, medications or surgeries. No known drug allergies.\par \par Pt sees her neurologist annually for screening for h/o seizures. Screening has been normal. \par \par PMHx: Asthma, seizures\par SHx: Denies\par FMHx: Father AML \par All: NKDA\par Med: Lo estrin, Keppra, Flonase, Arnuity \par  [TextBox_4] : TVUS 12/2021: dandre [Mammogramdate] : 03/2022 [BreastSonogramDate] : 03/2022 [PapSmeardate] : 12/2021 [FreeTextEntry3] : OCP

## 2023-05-08 LAB — HPV HIGH+LOW RISK DNA PNL CVX: NOT DETECTED

## 2023-05-09 LAB — CYTOLOGY CVX/VAG DOC THIN PREP: NORMAL

## 2023-05-24 ENCOUNTER — ASOB RESULT (OUTPATIENT)
Age: 48
End: 2023-05-24

## 2023-05-24 ENCOUNTER — APPOINTMENT (OUTPATIENT)
Dept: OBGYN | Facility: CLINIC | Age: 48
End: 2023-05-24
Payer: COMMERCIAL

## 2023-05-24 PROCEDURE — 76830 TRANSVAGINAL US NON-OB: CPT

## 2023-05-31 ENCOUNTER — NON-APPOINTMENT (OUTPATIENT)
Age: 48
End: 2023-05-31

## 2023-06-27 ENCOUNTER — APPOINTMENT (OUTPATIENT)
Dept: MAMMOGRAPHY | Facility: CLINIC | Age: 48
End: 2023-06-27
Payer: COMMERCIAL

## 2023-06-27 ENCOUNTER — OUTPATIENT (OUTPATIENT)
Dept: OUTPATIENT SERVICES | Facility: HOSPITAL | Age: 48
LOS: 1 days | End: 2023-06-27
Payer: COMMERCIAL

## 2023-06-27 ENCOUNTER — RESULT REVIEW (OUTPATIENT)
Age: 48
End: 2023-06-27

## 2023-06-27 ENCOUNTER — APPOINTMENT (OUTPATIENT)
Dept: ULTRASOUND IMAGING | Facility: CLINIC | Age: 48
End: 2023-06-27
Payer: COMMERCIAL

## 2023-06-27 DIAGNOSIS — Z01.419 ENCOUNTER FOR GYNECOLOGICAL EXAMINATION (GENERAL) (ROUTINE) WITHOUT ABNORMAL FINDINGS: ICD-10-CM

## 2023-06-27 PROCEDURE — 77067 SCR MAMMO BI INCL CAD: CPT

## 2023-06-27 PROCEDURE — 77063 BREAST TOMOSYNTHESIS BI: CPT | Mod: 26

## 2023-06-27 PROCEDURE — 76641 ULTRASOUND BREAST COMPLETE: CPT | Mod: 26,50

## 2023-06-27 PROCEDURE — 77067 SCR MAMMO BI INCL CAD: CPT | Mod: 26

## 2023-06-27 PROCEDURE — 77063 BREAST TOMOSYNTHESIS BI: CPT

## 2023-06-27 PROCEDURE — 76641 ULTRASOUND BREAST COMPLETE: CPT

## 2023-07-12 ENCOUNTER — RX RENEWAL (OUTPATIENT)
Age: 48
End: 2023-07-12

## 2023-10-03 ENCOUNTER — RX RENEWAL (OUTPATIENT)
Age: 48
End: 2023-10-03

## 2023-12-11 ENCOUNTER — APPOINTMENT (OUTPATIENT)
Dept: NEUROLOGY | Facility: CLINIC | Age: 48
End: 2023-12-11
Payer: COMMERCIAL

## 2023-12-11 VITALS
DIASTOLIC BLOOD PRESSURE: 77 MMHG | BODY MASS INDEX: 18.65 KG/M2 | HEIGHT: 60 IN | WEIGHT: 95 LBS | HEART RATE: 99 BPM | SYSTOLIC BLOOD PRESSURE: 121 MMHG

## 2023-12-11 DIAGNOSIS — Z80.6 FAMILY HISTORY OF LEUKEMIA: ICD-10-CM

## 2023-12-11 PROCEDURE — 99205 OFFICE O/P NEW HI 60 MIN: CPT

## 2023-12-11 RX ORDER — GLATIRAMER ACETATE 20 MG/ML
INJECTION, SOLUTION SUBCUTANEOUS
Refills: 0 | Status: DISCONTINUED | COMMUNITY
End: 2023-12-11

## 2023-12-11 RX ORDER — CHOLECALCIFEROL (VITAMIN D3) 25 MCG
TABLET ORAL
Refills: 0 | Status: ACTIVE | COMMUNITY

## 2023-12-11 RX ORDER — NORETHINDRONE ACETATE AND ETHINYL ESTRADIOL AND FERROUS FUMARATE 1MG-20(21)
1-20 KIT ORAL DAILY
Qty: 3 | Refills: 3 | Status: DISCONTINUED | COMMUNITY
Start: 2021-12-27 | End: 2023-12-11

## 2023-12-11 RX ORDER — METHYLPREDNISOLONE 4 MG/1
4 TABLET ORAL
Qty: 1 | Refills: 0 | Status: DISCONTINUED | COMMUNITY
Start: 2022-05-13 | End: 2023-12-11

## 2024-01-12 RX ORDER — LEVETIRACETAM 500 MG/1
500 TABLET, FILM COATED ORAL
Qty: 60 | Refills: 11 | Status: ACTIVE | COMMUNITY
Start: 1900-01-01 | End: 1900-01-01

## 2024-01-29 ENCOUNTER — APPOINTMENT (OUTPATIENT)
Dept: NEUROLOGY | Facility: CLINIC | Age: 49
End: 2024-01-29
Payer: COMMERCIAL

## 2024-01-29 VITALS
BODY MASS INDEX: 18.65 KG/M2 | HEART RATE: 102 BPM | SYSTOLIC BLOOD PRESSURE: 132 MMHG | WEIGHT: 95 LBS | HEIGHT: 60 IN | DIASTOLIC BLOOD PRESSURE: 86 MMHG

## 2024-01-29 PROCEDURE — 99204 OFFICE O/P NEW MOD 45 MIN: CPT

## 2024-01-29 NOTE — ASSESSMENT
[FreeTextEntry1] : 49 yo RH woman with history suggestive of focal epilepsy (first seizure occurred with aura/clinical symptoms prior to secondary generalization). Seizure free on LEV monotherapy, compliant, with no adverse effects.  Patient is content with current treatment, and has no desire to discontinue LEV in the future.  Plan: 1. Continue LEV 500mg PO BID 2. Patient defers further EEG testing at this time (and would not change current management plan). 3. She will notify us in the future if any adverse effects from LEV occur, or if any suspected clinical seizure activity occurs. 4. Follow up with Dr Cai as scheduled. 5. Patient agrees with plan.  6. Follow up with epilepsy prn.  Jay Marcial MD [facility]  Greater than 50% of the encounter was spent on counseling and coordination of care discussing differential diagnosis, diagnostic testing, and treatment options. We have talked about appropriate follow up, and I have spent 45 minutes of face to face time with the patient.  More than 50% of time spent counseling and educating patient about epilepsy specific safety issues including ASM side effects and interactions, alcohol consumption, sleep deprivation, risks and driving privileges associated with the [place] Guidelines, what is SUDEP and death related to seizures/SUDEP, seizure 1st aid and risks.

## 2024-01-29 NOTE — PHYSICAL EXAM
[FreeTextEntry1] : Awake, alert, oriented x 3.  Language fluent.  Comprehension intact.  Naming intact.  Repetition intact.  Affect normal.  Cranial nerves grossly intact.  Motor exam: normal bulk, normal tone, 5/5 in all four extremities.  No tremors or fasciculations.  DTRs: 3+ throughout.  Coordination: no dysmetria.  Gait: normal gait.  Romberg - negative

## 2024-01-29 NOTE — HISTORY OF PRESENT ILLNESS
[FreeTextEntry1] : 49 yo RH woman, with a history of seizures, referred by Dr Cai.  She is being followed by Dr Cai for white matter abnormalities  on brain and spinal MRI.  The first seizure occurred in 2005 at age 30.  She was at work, and remembers feeling "weird" prior to losing consciousness.  For example, when trying to call her  she could not dial the numbers on her phone.  She then lost consciousness sitting in her chair, and was told by coworkers that she had a convulsion.  She woke up on the floor with her coworks around her, EMS on the way.  Medication was not started because work up including brain MRI and EEG was normal (she had a 24-hr amb EEG which was normal).  The second seizure occurred in 2007, and was a nocturanl GTC seizure out of sleep witnessed by her , and associated with urinary incontinence.  After this seizure, she was started on LEV 500mg BID.  The third seizure occurred when she was off LEV in 2010, and was also a nocturnal GTC seizure.  She had stopped taking LEV during her pregnancy (did not have any seizures during pregnancy), and then had the seizure the following year after the pregnancy while still off of LEV.  After resuming LEV, she has remained seizure free ever since, and had a second pregnancy while on LEV, with no seizures during the pregnancy.  She tolerates LEV well and takes it compliantly, with no adverse effects.  No history of head trauma/meningitis/encephalitis/febrile seizures/birth trauma/developmental delay/stroke/brain tumor.   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  Current AEDs: LEV 500mg PO BID  Previous AEDs: None  MRI brain (Jan 2023): no cortical lesions or hippocampal sclerosis noted

## 2024-02-05 ENCOUNTER — APPOINTMENT (OUTPATIENT)
Dept: PULMONOLOGY | Facility: CLINIC | Age: 49
End: 2024-02-05
Payer: COMMERCIAL

## 2024-02-05 VITALS — OXYGEN SATURATION: 98 % | DIASTOLIC BLOOD PRESSURE: 72 MMHG | SYSTOLIC BLOOD PRESSURE: 115 MMHG | HEART RATE: 92 BPM

## 2024-02-05 DIAGNOSIS — G35 MULTIPLE SCLEROSIS: ICD-10-CM

## 2024-02-05 DIAGNOSIS — J45.21 MILD INTERMITTENT ASTHMA WITH (ACUTE) EXACERBATION: ICD-10-CM

## 2024-02-05 DIAGNOSIS — J45.30 MILD PERSISTENT ASTHMA, UNCOMPLICATED: ICD-10-CM

## 2024-02-05 PROCEDURE — 99213 OFFICE O/P EST LOW 20 MIN: CPT

## 2024-02-05 RX ORDER — ALBUTEROL SULFATE 90 UG/1
108 (90 BASE) INHALANT RESPIRATORY (INHALATION)
Qty: 1 | Refills: 5 | Status: ACTIVE | COMMUNITY
Start: 2024-02-05 | End: 1900-01-01

## 2024-02-05 RX ORDER — METHYLPREDNISOLONE 4 MG/1
4 TABLET ORAL
Qty: 1 | Refills: 0 | Status: ACTIVE | COMMUNITY
Start: 2024-02-05 | End: 1900-01-01

## 2024-02-05 NOTE — PROCEDURE
[FreeTextEntry1] : 01/06/2023 Pulmonary function testing FEV1, FVC, and FEV1/FVC are within normal limits.

## 2024-02-05 NOTE — ASSESSMENT
[FreeTextEntry1] : Continue Arnuity PRN Beta Agonist. Medrol vishal F/U 1-2 weeks if not improved.

## 2024-02-05 NOTE — DISCUSSION/SUMMARY
[FreeTextEntry1] : Asthma with probable mild exacerbation related to Viral upper respiratory tract infection. Hyperlipidemia. Multiple sclerosis.  Mild. Off Copaxone.

## 2024-02-05 NOTE — PHYSICAL EXAM
[No Acute Distress] : no acute distress [Supple] : supple [Thyroid Not Enlarged] : thyroid not enlarged [No Neck Mass] : no neck mass [No JVD] : no jvd [Normal S1, S2] : normal s1, s2 [No Murmurs] : no murmurs [Clear to Auscultation Bilaterally] : clear to auscultation bilaterally [Normal to Percussion] : normal to percussion [No Abnormalities] : no abnormalities [Benign] : benign [Not Tender] : not tender [No HSM] : no hsm [No Clubbing] : no clubbing [No Cyanosis] : no cyanosis [No Edema] : no edema [No Focal Deficits] : no focal deficits [Oriented x3] : oriented x3 [TextBox_80] : Breast examination negative.

## 2024-02-05 NOTE — HISTORY OF PRESENT ILLNESS
[Never] : never [TextBox_4] : did get sick last Tuesday, started with sore throat for 1 days and then left with cough very little mucus, cant sleep at night sinus ok. Typical pattern.  on Arnuity daily no albuterol use no wheeze, no SOB.   seeing neuro for MS on Copaxone generic and stable  did not get flu or covid shot.  Had COVID May 2022

## 2024-03-11 ENCOUNTER — APPOINTMENT (OUTPATIENT)
Dept: INTERNAL MEDICINE | Facility: CLINIC | Age: 49
End: 2024-03-11
Payer: COMMERCIAL

## 2024-03-11 ENCOUNTER — NON-APPOINTMENT (OUTPATIENT)
Age: 49
End: 2024-03-11

## 2024-03-11 VITALS
SYSTOLIC BLOOD PRESSURE: 112 MMHG | BODY MASS INDEX: 19.63 KG/M2 | HEIGHT: 60 IN | TEMPERATURE: 97.9 F | HEART RATE: 94 BPM | DIASTOLIC BLOOD PRESSURE: 68 MMHG | WEIGHT: 100 LBS | OXYGEN SATURATION: 100 %

## 2024-03-11 DIAGNOSIS — Z00.00 ENCOUNTER FOR GENERAL ADULT MEDICAL EXAMINATION W/OUT ABNORMAL FINDINGS: ICD-10-CM

## 2024-03-11 DIAGNOSIS — R05.9 COUGH, UNSPECIFIED: ICD-10-CM

## 2024-03-11 LAB
CHOLEST SERPL-MCNC: 278 MG/DL
HDLC SERPL-MCNC: 93 MG/DL
LDLC SERPL CALC-MCNC: 168 MG/DL
NONHDLC SERPL-MCNC: 184 MG/DL
TRIGL SERPL-MCNC: 83 MG/DL

## 2024-03-11 PROCEDURE — 93000 ELECTROCARDIOGRAM COMPLETE: CPT

## 2024-03-11 PROCEDURE — 99386 PREV VISIT NEW AGE 40-64: CPT

## 2024-03-11 RX ORDER — FLUTICASONE FUROATE 100 UG/1
100 POWDER RESPIRATORY (INHALATION)
Qty: 1 | Refills: 5 | Status: ACTIVE | COMMUNITY
Start: 2019-01-04 | End: 1900-01-01

## 2024-03-11 RX ORDER — AZITHROMYCIN 250 MG/1
250 TABLET, FILM COATED ORAL
Qty: 1 | Refills: 0 | Status: ACTIVE | COMMUNITY
Start: 2024-03-11 | End: 1900-01-01

## 2024-03-11 RX ORDER — FLUTICASONE PROPIONATE 50 UG/1
50 SPRAY, METERED NASAL
Qty: 1 | Refills: 5 | Status: ACTIVE | COMMUNITY
Start: 2018-07-11 | End: 1900-01-01

## 2024-03-11 NOTE — HISTORY OF PRESENT ILLNESS
[de-identified] : 49 year old female presents for initial annual exam.  overall is doing okay besides ongoing dry cough.  s/p augmentin and medrol dose pack and recent prednisone taper with some relief.  no other acute issues   h/o MS, off copaxone  Dr. Lillie Longoria h/o seizure disorder on Keppra.  fmhx of epilepsy  h/o HLD, Ct calcium score- 0 in 2023.      homemaker, but starting PT position in school  non-smoker

## 2024-03-11 NOTE — ASSESSMENT
[FreeTextEntry1] : //  ongoing dry cough.  s/p augmentin and medrol dose pack and recent prednisone taper with some relief.  no other acute issues  -start zpack to cover atypicals  -Advised if symptoms worsen please call office  Asthma- stabkle  -cont arnuity as directed   h/o MS, off copaxone  Dr. Lillie Wing.  grossly silent.  only had MRI findings -f/u as epr neuro   seizure disorder on Keppra.  fmhx of epilepsy no seizure-like activity for several years.  -f/u as per neuro   h/o HLD, Ct calcium score- 0 in 2023.   -Educated of the importance of Healthy diet, such as Mediterranean Diet and Exercise, such as walking >20 minutes a day and increasing gradually as tolerated   Healthcare Maintenance -Advise Yearly Skin cancer screening with Dermatologist  -Advise Yearly Eye exam with Ophthalmologist -Advise Yearly Dental exam -Educated of the importance of Healthy diet, such as Mediterranean Diet and Exercise, such as walking >20 minutes a day and increasing gradually as tolerated  Immunizations -Flu vaccine  -Covid vaccine   Preventative screening  -advised to get PAP for cervical cancer screening -up to date  -advised to get mammogram for breast cancer screening -up to date  -advised to get colonoscopy for colon cancer screening -scheduled

## 2024-03-11 NOTE — PHYSICAL EXAM
[Pedal Pulses Present] : the pedal pulses are present [Normal] : normal rate, regular rhythm, normal S1 and S2 and no murmur heard [No Extremity Clubbing/Cyanosis] : no extremity clubbing/cyanosis [No Edema] : there was no peripheral edema [Soft] : abdomen soft [Non Tender] : non-tender [Non-distended] : non-distended [No HSM] : no HSM [Normal Posterior Cervical Nodes] : no posterior cervical lymphadenopathy [Normal Anterior Cervical Nodes] : no anterior cervical lymphadenopathy [No CVA Tenderness] : no CVA  tenderness [No Joint Swelling] : no joint swelling [No Focal Deficits] : no focal deficits [No Rash] : no rash [Normal Affect] : the affect was normal [Normal Mood] : the mood was normal

## 2024-03-11 NOTE — HEALTH RISK ASSESSMENT
[Good] : ~his/her~ current health as good [0] : 2) Feeling down, depressed, or hopeless: Not at all (0) [PHQ-2 Negative - No further assessment needed] : PHQ-2 Negative - No further assessment needed [Patient reported mammogram was normal] : Patient reported mammogram was normal [Patient reported PAP Smear was normal] : Patient reported PAP Smear was normal [Never] : Never [HIU1Glgxe] : 0 [MammogramDate] : 2023 [PapSmearDate] : 2023 [ColonoscopyDate] : scheduled  [ColonoscopyComments] : Dr. Montelongo

## 2024-04-16 ENCOUNTER — LABORATORY RESULT (OUTPATIENT)
Age: 49
End: 2024-04-16

## 2024-04-19 ENCOUNTER — TRANSCRIPTION ENCOUNTER (OUTPATIENT)
Age: 49
End: 2024-04-19

## 2024-05-14 ENCOUNTER — NON-APPOINTMENT (OUTPATIENT)
Age: 49
End: 2024-05-14

## 2024-05-15 ENCOUNTER — APPOINTMENT (OUTPATIENT)
Dept: OBGYN | Facility: CLINIC | Age: 49
End: 2024-05-15
Payer: COMMERCIAL

## 2024-05-15 VITALS
HEIGHT: 60 IN | DIASTOLIC BLOOD PRESSURE: 82 MMHG | SYSTOLIC BLOOD PRESSURE: 125 MMHG | WEIGHT: 95 LBS | BODY MASS INDEX: 18.65 KG/M2

## 2024-05-15 DIAGNOSIS — Z01.419 ENCOUNTER FOR GYNECOLOGICAL EXAMINATION (GENERAL) (ROUTINE) W/OUT ABNORMAL FINDINGS: ICD-10-CM

## 2024-05-15 PROCEDURE — 82270 OCCULT BLOOD FECES: CPT

## 2024-05-15 PROCEDURE — 99396 PREV VISIT EST AGE 40-64: CPT

## 2024-05-15 RX ORDER — NORETHINDRONE ACETATE/ETHINYL ESTRADIOL AND FERROUS FUMARATE 1.5-30(21)
1.5-3 KIT ORAL DAILY
Qty: 84 | Refills: 3 | Status: ACTIVE | COMMUNITY
Start: 2021-12-27 | End: 1900-01-01

## 2024-05-16 LAB — HPV HIGH+LOW RISK DNA PNL CVX: NOT DETECTED

## 2024-05-20 ENCOUNTER — APPOINTMENT (OUTPATIENT)
Dept: OBGYN | Facility: CLINIC | Age: 49
End: 2024-05-20

## 2024-05-21 LAB — CYTOLOGY CVX/VAG DOC THIN PREP: NORMAL

## 2024-05-23 ENCOUNTER — APPOINTMENT (OUTPATIENT)
Dept: OBGYN | Facility: CLINIC | Age: 49
End: 2024-05-23
Payer: COMMERCIAL

## 2024-05-23 ENCOUNTER — ASOB RESULT (OUTPATIENT)
Age: 49
End: 2024-05-23

## 2024-05-23 PROCEDURE — 76830 TRANSVAGINAL US NON-OB: CPT

## 2024-06-04 ENCOUNTER — NON-APPOINTMENT (OUTPATIENT)
Age: 49
End: 2024-06-04

## 2024-06-13 ENCOUNTER — APPOINTMENT (OUTPATIENT)
Dept: NEUROLOGY | Facility: CLINIC | Age: 49
End: 2024-06-13
Payer: COMMERCIAL

## 2024-06-13 VITALS — HEIGHT: 60 IN | WEIGHT: 97 LBS | BODY MASS INDEX: 19.04 KG/M2

## 2024-06-13 DIAGNOSIS — R93.0 ABNORMAL FINDINGS ON DIAGNOSTIC IMAGING OF SKULL AND HEAD, NOT ELSEWHERE CLASSIFIED: ICD-10-CM

## 2024-06-13 DIAGNOSIS — G40.909 EPILEPSY, UNSPECIFIED, NOT INTRACTABLE, W/OUT STATUS EPILEPTICUS: ICD-10-CM

## 2024-06-13 PROCEDURE — 99214 OFFICE O/P EST MOD 30 MIN: CPT

## 2024-06-13 PROCEDURE — G2211 COMPLEX E/M VISIT ADD ON: CPT | Mod: NC

## 2024-06-13 RX ORDER — GLATIRAMER 40 MG/ML
40 INJECTION, SOLUTION SUBCUTANEOUS
Refills: 0 | Status: DISCONTINUED | COMMUNITY
End: 2024-06-13

## 2024-06-13 NOTE — ASSESSMENT
[FreeTextEntry1] : Assessment/Plan:  48 year old female w/ hx of seizure disorder, referred by Dr Glaser for continued management of multiple sclerosis.  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 Though her MRI scans are c/w demyelinating lesions and meet the radiological diagnosis of RIS, she denies any clinical hx suggestive of MS at this time.  Radiologically isolated syndrome, on copaxone since 2016, clinically and radiologically stable since at least 2016. No hx of relapses.  Given clinical/radiological stability and injection fatigue, we discussed the following options-  1. Continue Copaxone for another 3 years (10 year stability) before considering dc'ing  2. Dc Copaxone and continue clinical/radiological monitoring.  3. Switch Copaxone to another DMT for (high risk) RIS  Pt opted for option # 2. She understands the unpredictable risk for a potential relapse.  # Radiologically isolated syndrome- off copaxone since 12/2023, clinically stable # seizure disorder- well controlled on  mg BID # L ear pain (sporadic, long standing)- unclear hx. Primary otalgia vs geniculate neuralgia   Plan:- [] Monitoring off DMT [] Repeat MRI brain and C/T spine w/w/o contrast June 2024, and if stable, will repeat in a year (6/2025) [] I stressed on the importance of healthy eating habits, routine physical therapy and vitamin D supplementation. [] Continue keppra 500 mg BID  Return to clinic 1 year, or sooner if needed  The above plan was discussed with WAYLON MACKENZIE in great detail. WAYLON MACKENZIE verbalized understanding and agrees with plan as detailed above. Patient was provided education and counselling on current diagnosis/symptoms. She was advised to call our clinic at [phone] for any new or worsening symptoms, or with any questions or concerns. WAYLON MACKENZIE expressed understanding and all her questions/concerns were addressed.  Lillie Cai M.D.

## 2024-06-13 NOTE — HISTORY OF PRESENT ILLNESS
[FreeTextEntry1] : INTERIM HX 2024: She has been off copaxone since 2023. Seen by Dr Marcial for seizure disorder, plan to continue on  mg BID. She is doing well. She reports long standing hx of intermittent L ear pain (inside ear and behind ear), sometimes feels sharp, no jaw pain/clicking, no grinding, no radiating pain to head/neck/face. NO hearing loss.  Seen by ENT in past and told everything was okay.   HPI (initial visit Dec 11, 2023)- WAYLON MACKENZIE is a 48 year old  woman referred for MS.  Hx of seizures- ,  and . First seizure occurred while at work- [employer]. She started to see Dr Coon, no AED at that time. The second two occurred in sleep- [employer] and urinary incontinence. She has been on keppra 500 mg BID now for 20 years.  Seizures well controlled. A brain MRI in  showed "white matter changes", but no mention of having MS at that time. Of note, she has spinal tap in  which was neg.   She has been followed by Dr David Glaser since 2016. He recommended seeing MS specialist.  He repeated MRI's. MRI brain showed white matter lesions and a lesions in cervical/thoracic spine. MRI brain showed new lesions compared 2006.  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  She was started on Copaxone 40 mg 3x a week.  Yearly MRI's at [facility] been stable. last MRI brain and cord 2023.  no s/e of Copaxone, though  has to do the injections.   She denies any hx of vision loss, diplopia, vertigo, numbness/tingling in extremities. weakness, bell's palsy, trigeminal neuralgia or gait imbalance.  No chest hug or Lhermitte phenomena.  Bowel and bladder function intact.   No family hx of MS or autoimmune disease Father had CAD s/p CABG and  from acute leukemia.  Maternal uncle with Epilepsy.

## 2024-06-13 NOTE — PHYSICAL EXAM
[FreeTextEntry1] : PHYSICAL EXAM Constitutional: Alert, no acute distress Psychiatric: appropriate affect and mood Pulmonary: No respiratory distress, stable on room air  NEUROLOGICAL EXAM Mental status: The patient is alert, attentive and conversational memory intact. Speech/language: No dysarthria Cranial nerves: CN II: Visual fields are full to confrontation. Pupil size equal and briskly reactive to light. No APD CN III, IV, VI: EOMI, no nystagmus, no ptosis CN V: Facial sensation is intact to pinprick in all 3 divisions bilaterally. CN VII: Face is symmetric with normal eye closure and smile. CN VII: Hearing is normal to rubbing fingers CN IX, X: Palate elevates symmetrically. CN XI: Head turning and shoulder shrug are intact CN XII: Tongue is midline with normal movements and no atrophy. Motor: Strength is full bilaterally. 5/5 muscle power in bilateral UE and LE. Reflexes: R L  Biceps 3+ 3+  Patellar 3+ 3+  Achilles 3+ 3+ No downing or clonus Plantar responses- R down, L down Sensory: RUE/RLE LUE/LLE light touch +/+ +/+ Pinprick + /+ +/+ Coordination/Cerebellar: There is no dysmetria on finger-to-nose and heel to shin. Gait/Stance: Posture is normal. Gait is steady with normal steps, base, arm swing, and turning. Tandem gait is normal. Romberg is negative.

## 2024-06-28 ENCOUNTER — APPOINTMENT (OUTPATIENT)
Dept: ULTRASOUND IMAGING | Facility: CLINIC | Age: 49
End: 2024-06-28

## 2024-06-28 ENCOUNTER — APPOINTMENT (OUTPATIENT)
Dept: MAMMOGRAPHY | Facility: CLINIC | Age: 49
End: 2024-06-28

## 2024-07-08 ENCOUNTER — APPOINTMENT (OUTPATIENT)
Dept: MAMMOGRAPHY | Facility: CLINIC | Age: 49
End: 2024-07-08

## 2024-07-08 ENCOUNTER — APPOINTMENT (OUTPATIENT)
Dept: ULTRASOUND IMAGING | Facility: CLINIC | Age: 49
End: 2024-07-08

## 2024-07-22 ENCOUNTER — APPOINTMENT (OUTPATIENT)
Dept: MRI IMAGING | Facility: CLINIC | Age: 49
End: 2024-07-22

## 2024-07-24 ENCOUNTER — APPOINTMENT (OUTPATIENT)
Dept: MRI IMAGING | Facility: CLINIC | Age: 49
End: 2024-07-24

## 2024-07-29 ENCOUNTER — APPOINTMENT (OUTPATIENT)
Dept: ULTRASOUND IMAGING | Facility: CLINIC | Age: 49
End: 2024-07-29

## 2024-07-29 ENCOUNTER — APPOINTMENT (OUTPATIENT)
Dept: MAMMOGRAPHY | Facility: CLINIC | Age: 49
End: 2024-07-29

## 2024-07-31 ENCOUNTER — APPOINTMENT (OUTPATIENT)
Dept: MAMMOGRAPHY | Facility: CLINIC | Age: 49
End: 2024-07-31
Payer: COMMERCIAL

## 2024-07-31 ENCOUNTER — RESULT REVIEW (OUTPATIENT)
Age: 49
End: 2024-07-31

## 2024-07-31 ENCOUNTER — APPOINTMENT (OUTPATIENT)
Dept: ULTRASOUND IMAGING | Facility: CLINIC | Age: 49
End: 2024-07-31
Payer: COMMERCIAL

## 2024-07-31 PROCEDURE — 77067 SCR MAMMO BI INCL CAD: CPT

## 2024-07-31 PROCEDURE — 76641 ULTRASOUND BREAST COMPLETE: CPT | Mod: 50

## 2024-07-31 PROCEDURE — 77063 BREAST TOMOSYNTHESIS BI: CPT

## 2024-08-19 ENCOUNTER — APPOINTMENT (OUTPATIENT)
Dept: MRI IMAGING | Facility: CLINIC | Age: 49
End: 2024-08-19

## 2024-08-19 ENCOUNTER — OUTPATIENT (OUTPATIENT)
Dept: OUTPATIENT SERVICES | Facility: HOSPITAL | Age: 49
LOS: 1 days | End: 2024-08-19
Payer: COMMERCIAL

## 2024-08-19 DIAGNOSIS — G35 MULTIPLE SCLEROSIS: ICD-10-CM

## 2024-08-19 PROCEDURE — 72157 MRI CHEST SPINE W/O & W/DYE: CPT | Mod: 26

## 2024-08-19 PROCEDURE — 70553 MRI BRAIN STEM W/O & W/DYE: CPT

## 2024-08-19 PROCEDURE — 76377 3D RENDER W/INTRP POSTPROCES: CPT | Mod: 26

## 2024-08-19 PROCEDURE — 72156 MRI NECK SPINE W/O & W/DYE: CPT

## 2024-08-19 PROCEDURE — 72156 MRI NECK SPINE W/O & W/DYE: CPT | Mod: 26

## 2024-08-19 PROCEDURE — 72157 MRI CHEST SPINE W/O & W/DYE: CPT

## 2024-08-19 PROCEDURE — 70553 MRI BRAIN STEM W/O & W/DYE: CPT | Mod: 26

## 2024-08-19 PROCEDURE — 76377 3D RENDER W/INTRP POSTPROCES: CPT

## 2024-08-19 PROCEDURE — A9585: CPT

## 2024-08-28 ENCOUNTER — TRANSCRIPTION ENCOUNTER (OUTPATIENT)
Age: 49
End: 2024-08-28

## 2024-08-28 ENCOUNTER — NON-APPOINTMENT (OUTPATIENT)
Age: 49
End: 2024-08-28

## 2025-02-04 ENCOUNTER — APPOINTMENT (OUTPATIENT)
Dept: INTERNAL MEDICINE | Facility: CLINIC | Age: 50
End: 2025-02-04
Payer: COMMERCIAL

## 2025-02-04 VITALS
WEIGHT: 98 LBS | HEART RATE: 89 BPM | OXYGEN SATURATION: 96 % | BODY MASS INDEX: 19.24 KG/M2 | HEIGHT: 60 IN | RESPIRATION RATE: 15 BRPM | SYSTOLIC BLOOD PRESSURE: 112 MMHG | DIASTOLIC BLOOD PRESSURE: 71 MMHG

## 2025-02-04 DIAGNOSIS — G40.909 EPILEPSY, UNSPECIFIED, NOT INTRACTABLE, W/OUT STATUS EPILEPTICUS: ICD-10-CM

## 2025-02-04 DIAGNOSIS — E78.5 HYPERLIPIDEMIA, UNSPECIFIED: ICD-10-CM

## 2025-02-04 DIAGNOSIS — G35 MULTIPLE SCLEROSIS: ICD-10-CM

## 2025-02-04 DIAGNOSIS — R42 DIZZINESS AND GIDDINESS: ICD-10-CM

## 2025-02-04 PROCEDURE — G2211 COMPLEX E/M VISIT ADD ON: CPT | Mod: NC

## 2025-02-04 PROCEDURE — 99214 OFFICE O/P EST MOD 30 MIN: CPT

## 2025-02-18 ENCOUNTER — RX RENEWAL (OUTPATIENT)
Age: 50
End: 2025-02-18

## 2025-02-26 ENCOUNTER — RX RENEWAL (OUTPATIENT)
Age: 50
End: 2025-02-26

## 2025-04-29 ENCOUNTER — RX RENEWAL (OUTPATIENT)
Age: 50
End: 2025-04-29

## 2025-05-03 ENCOUNTER — NON-APPOINTMENT (OUTPATIENT)
Age: 50
End: 2025-05-03

## 2025-05-05 ENCOUNTER — APPOINTMENT (OUTPATIENT)
Dept: INTERNAL MEDICINE | Facility: CLINIC | Age: 50
End: 2025-05-05
Payer: COMMERCIAL

## 2025-05-05 ENCOUNTER — NON-APPOINTMENT (OUTPATIENT)
Age: 50
End: 2025-05-05

## 2025-05-05 VITALS
OXYGEN SATURATION: 99 % | DIASTOLIC BLOOD PRESSURE: 72 MMHG | HEART RATE: 89 BPM | SYSTOLIC BLOOD PRESSURE: 113 MMHG | RESPIRATION RATE: 16 BRPM | BODY MASS INDEX: 19.44 KG/M2 | WEIGHT: 99 LBS | HEIGHT: 60 IN

## 2025-05-05 DIAGNOSIS — Z00.00 ENCOUNTER FOR GENERAL ADULT MEDICAL EXAMINATION W/OUT ABNORMAL FINDINGS: ICD-10-CM

## 2025-05-05 DIAGNOSIS — E78.5 HYPERLIPIDEMIA, UNSPECIFIED: ICD-10-CM

## 2025-05-05 PROCEDURE — 93000 ELECTROCARDIOGRAM COMPLETE: CPT

## 2025-05-05 PROCEDURE — 36415 COLL VENOUS BLD VENIPUNCTURE: CPT

## 2025-05-05 PROCEDURE — 99396 PREV VISIT EST AGE 40-64: CPT

## 2025-05-19 ENCOUNTER — OUTPATIENT (OUTPATIENT)
Dept: OUTPATIENT SERVICES | Facility: HOSPITAL | Age: 50
LOS: 1 days | End: 2025-05-19
Payer: COMMERCIAL

## 2025-05-19 ENCOUNTER — APPOINTMENT (OUTPATIENT)
Dept: ULTRASOUND IMAGING | Facility: CLINIC | Age: 50
End: 2025-05-19
Payer: COMMERCIAL

## 2025-05-19 DIAGNOSIS — E78.5 HYPERLIPIDEMIA, UNSPECIFIED: ICD-10-CM

## 2025-05-19 PROCEDURE — 93880 EXTRACRANIAL BILAT STUDY: CPT

## 2025-05-19 PROCEDURE — 93880 EXTRACRANIAL BILAT STUDY: CPT | Mod: 26

## 2025-05-20 ENCOUNTER — CLINICAL ADVICE (OUTPATIENT)
Age: 50
End: 2025-05-20

## 2025-05-27 ENCOUNTER — RX RENEWAL (OUTPATIENT)
Age: 50
End: 2025-05-27

## 2025-06-06 ENCOUNTER — APPOINTMENT (OUTPATIENT)
Dept: NEUROLOGY | Facility: CLINIC | Age: 50
End: 2025-06-06
Payer: COMMERCIAL

## 2025-06-06 VITALS
WEIGHT: 98 LBS | DIASTOLIC BLOOD PRESSURE: 79 MMHG | HEIGHT: 60 IN | SYSTOLIC BLOOD PRESSURE: 119 MMHG | HEART RATE: 86 BPM | RESPIRATION RATE: 21 BRPM | OXYGEN SATURATION: 99 % | BODY MASS INDEX: 19.24 KG/M2

## 2025-06-06 PROCEDURE — 99214 OFFICE O/P EST MOD 30 MIN: CPT

## 2025-06-06 PROCEDURE — G2211 COMPLEX E/M VISIT ADD ON: CPT | Mod: NC

## 2025-06-10 ENCOUNTER — RX RENEWAL (OUTPATIENT)
Age: 50
End: 2025-06-10

## 2025-08-26 ENCOUNTER — RX RENEWAL (OUTPATIENT)
Age: 50
End: 2025-08-26

## 2025-08-29 LAB
M TB IFN-G BLD-IMP: NEGATIVE
QUANTIFERON TB PLUS MITOGEN MINUS NIL: >10 IU/ML
QUANTIFERON TB PLUS NIL: 0.02 IU/ML
QUANTIFERON TB PLUS TB1 MINUS NIL: 0 IU/ML
QUANTIFERON TB PLUS TB2 MINUS NIL: 0 IU/ML

## 2025-09-02 ENCOUNTER — RX RENEWAL (OUTPATIENT)
Age: 50
End: 2025-09-02

## 2025-09-02 ENCOUNTER — CLINICAL ADVICE (OUTPATIENT)
Age: 50
End: 2025-09-02

## 2025-09-10 RX ORDER — NORETHINDRONE ACETATE AND ETHINYL ESTRADIOL 1.5-30(21)
1.5-3 KIT ORAL DAILY
Qty: 3 | Refills: 0 | Status: ACTIVE | COMMUNITY
Start: 2025-09-02 | End: 1900-01-01

## 2025-09-15 ENCOUNTER — APPOINTMENT (OUTPATIENT)
Dept: OBGYN | Facility: CLINIC | Age: 50
End: 2025-09-15